# Patient Record
Sex: FEMALE | Race: OTHER | NOT HISPANIC OR LATINO | URBAN - METROPOLITAN AREA
[De-identification: names, ages, dates, MRNs, and addresses within clinical notes are randomized per-mention and may not be internally consistent; named-entity substitution may affect disease eponyms.]

---

## 2020-03-12 ENCOUNTER — EMERGENCY (EMERGENCY)
Facility: HOSPITAL | Age: 35
LOS: 1 days | Discharge: HOME | End: 2020-03-12
Attending: EMERGENCY MEDICINE
Payer: SELF-PAY

## 2020-03-12 VITALS
RESPIRATION RATE: 20 BRPM | OXYGEN SATURATION: 100 % | DIASTOLIC BLOOD PRESSURE: 86 MMHG | SYSTOLIC BLOOD PRESSURE: 136 MMHG | TEMPERATURE: 98 F | HEART RATE: 94 BPM

## 2020-03-12 LAB
ALBUMIN SERPL ELPH-MCNC: 4 G/DL — SIGNIFICANT CHANGE UP (ref 3.3–5.2)
ALP SERPL-CCNC: 74 U/L — SIGNIFICANT CHANGE UP (ref 40–120)
ALT FLD-CCNC: 14 U/L — SIGNIFICANT CHANGE UP
ANION GAP SERPL CALC-SCNC: 14 MMOL/L — SIGNIFICANT CHANGE UP (ref 5–17)
APPEARANCE UR: ABNORMAL
AST SERPL-CCNC: 20 U/L — SIGNIFICANT CHANGE UP
BACTERIA # UR AUTO: ABNORMAL
BILIRUB SERPL-MCNC: <0.2 MG/DL — LOW (ref 0.4–2)
BILIRUB UR-MCNC: NEGATIVE — SIGNIFICANT CHANGE UP
BLD GP AB SCN SERPL QL: SIGNIFICANT CHANGE UP
BUN SERPL-MCNC: 14 MG/DL — SIGNIFICANT CHANGE UP (ref 8–20)
CALCIUM SERPL-MCNC: 9.5 MG/DL — SIGNIFICANT CHANGE UP (ref 8.6–10.2)
CHLORIDE SERPL-SCNC: 102 MMOL/L — SIGNIFICANT CHANGE UP (ref 98–107)
CO2 SERPL-SCNC: 22 MMOL/L — SIGNIFICANT CHANGE UP (ref 22–29)
COLOR SPEC: YELLOW — SIGNIFICANT CHANGE UP
CREAT SERPL-MCNC: 0.69 MG/DL — SIGNIFICANT CHANGE UP (ref 0.5–1.3)
DIFF PNL FLD: ABNORMAL
EPI CELLS # UR: SIGNIFICANT CHANGE UP
GLUCOSE SERPL-MCNC: 95 MG/DL — SIGNIFICANT CHANGE UP (ref 70–99)
GLUCOSE UR QL: NEGATIVE MG/DL — SIGNIFICANT CHANGE UP
HCG SERPL-ACNC: 4367 MIU/ML — HIGH
HCG UR QL: POSITIVE
HCT VFR BLD CALC: 34.7 % — SIGNIFICANT CHANGE UP (ref 34.5–45)
HGB BLD-MCNC: 10.9 G/DL — LOW (ref 11.5–15.5)
KETONES UR-MCNC: ABNORMAL
LEUKOCYTE ESTERASE UR-ACNC: ABNORMAL
MCHC RBC-ENTMCNC: 26.5 PG — LOW (ref 27–34)
MCHC RBC-ENTMCNC: 31.4 GM/DL — LOW (ref 32–36)
MCV RBC AUTO: 84.2 FL — SIGNIFICANT CHANGE UP (ref 80–100)
NITRITE UR-MCNC: NEGATIVE — SIGNIFICANT CHANGE UP
PH UR: 6 — SIGNIFICANT CHANGE UP (ref 5–8)
PLATELET # BLD AUTO: 317 K/UL — SIGNIFICANT CHANGE UP (ref 150–400)
POTASSIUM SERPL-MCNC: 3.9 MMOL/L — SIGNIFICANT CHANGE UP (ref 3.5–5.3)
POTASSIUM SERPL-SCNC: 3.9 MMOL/L — SIGNIFICANT CHANGE UP (ref 3.5–5.3)
PROT SERPL-MCNC: 7.7 G/DL — SIGNIFICANT CHANGE UP (ref 6.6–8.7)
PROT UR-MCNC: 30 MG/DL
RBC # BLD: 4.12 M/UL — SIGNIFICANT CHANGE UP (ref 3.8–5.2)
RBC # FLD: 17 % — HIGH (ref 10.3–14.5)
RBC CASTS # UR COMP ASSIST: >50 /HPF (ref 0–4)
SODIUM SERPL-SCNC: 138 MMOL/L — SIGNIFICANT CHANGE UP (ref 135–145)
SP GR SPEC: 1.02 — SIGNIFICANT CHANGE UP (ref 1.01–1.02)
UROBILINOGEN FLD QL: NEGATIVE MG/DL — SIGNIFICANT CHANGE UP
WBC # BLD: 9.84 K/UL — SIGNIFICANT CHANGE UP (ref 3.8–10.5)
WBC # FLD AUTO: 9.84 K/UL — SIGNIFICANT CHANGE UP (ref 3.8–10.5)
WBC UR QL: SIGNIFICANT CHANGE UP

## 2020-03-12 PROCEDURE — 76801 OB US < 14 WKS SINGLE FETUS: CPT | Mod: 26

## 2020-03-12 PROCEDURE — 99285 EMERGENCY DEPT VISIT HI MDM: CPT

## 2020-03-12 PROCEDURE — 76817 TRANSVAGINAL US OBSTETRIC: CPT | Mod: 26

## 2020-03-12 PROCEDURE — 76815 OB US LIMITED FETUS(S): CPT | Mod: 26

## 2020-03-12 RX ORDER — ACETAMINOPHEN 500 MG
975 TABLET ORAL ONCE
Refills: 0 | Status: COMPLETED | OUTPATIENT
Start: 2020-03-12 | End: 2020-03-12

## 2020-03-12 RX ADMIN — Medication 975 MILLIGRAM(S): at 20:43

## 2020-03-12 NOTE — ED ADULT NURSE NOTE - OBJECTIVE STATEMENT
pt reports lots of abd pain today, LMP Michael 10, believed to had miscarried on Saturday. bleeding still reported. never had any prenatal care for this pregnancy, states she took a pregnancy test a couple times and it was positive.

## 2020-03-12 NOTE — ED STATDOCS - NS ED ROS FT
Const: Denies fever, chills  HEENT: Denies blurry vision, sore throat  Neck: Denies neck pain/stiffness  Resp: Denies coughing, SOB  Cardiovascular: Denies CP, palpitations, LE edema  GI: +abdominal pain, denies nausea, vomiting, diarrhea, constipation, blood in stool  : + vaginal bleeding denies urinary frequency/urgency/dysuria, hematuria,   MSK: Denies back pain  Neuro: + HA Denies, dizziness, numbness, weakness  Skin: Denies rashes.

## 2020-03-12 NOTE — ED STATDOCS - PROGRESS NOTE DETAILS
PA NOTE: PT evaluated by intake physician. HPI/PE/ROS as noted above. Will follow up plan per intake physician PA NOTE: Spoke to radiologist who advised no doppler was performed and a doppler would be needed to further evaluate, order was placed and pt brought back to ultrasound. PA NOTE: Spoke to radiologist who advised pt did not receive doppler, order was replaced and brought back down to ultrasound PA NOTE: repeat ultrasound shows On Doppler imaging there is minimally increased vascularity in the endometrium, advised repeat ultraosund, f/u with obgyn, pt wants to leave at this time, Pt reassessed, pt feeling better at this time, vss, pt able to walk, talk and vocalized plan of action. Discussed in depth and explained to pt in depth the next steps that need to be taking including proper follow up with PCP or specialists. All incidental findings were discussed with pt as well. Pt verbalized their concerns and all questions were answered. Pt understands dispo and wants discharge. Given good instructions when to return to ED and importance of f/u. PA NOTE: repeat ultrasound shows On Doppler imaging there is minimally increased vascularity in the endometrium, advised repeat ultrasound, f/u with obgyn, pt wants to leave at this time, does not want to wait to see OBGYN, Pt reassessed, pt feeling better at this time, vss, pt able to walk, talk and vocalized plan of action. Discussed in depth and explained to pt in depth the next steps that need to be taking including proper follow up with PCP or specialists. All incidental findings were discussed with pt as well. Pt verbalized their concerns and all questions were answered. Pt understands dispo and wants discharge. Given good instructions when to return to ED and importance of f/u.

## 2020-03-12 NOTE — ED STATDOCS - OBJECTIVE STATEMENT
34 A0 7 weeks pregnant by date 6 days s/p miscarriage presents to ED c/o heavy vaginal bleeding and worsening non radiating suprapubic cramping pain x 6 days. Bleeding is described as heavy vaginal bleeding with + clot passage. Has changed her pads 3 x today. LMP 1/10/20.  Patient has mild diffuse HA at this time. Denies fever, chills, dysuria. Denies CP, SOB, dizziness, lightheadedness, vomiting, diarrhea. Took Tylenol this AM with no relief in pain. Does not have a GYN or PMD. Is a non smoker. No further complaints at this time.

## 2020-03-12 NOTE — ED STATDOCS - PHYSICAL EXAMINATION
Const: Awake, alert and oriented. In no acute distress. Well appearing.  HEENT: NC/AT. Moist mucous membranes.  Eyes: No scleral icterus. EOMI.  Neck:. Soft and supple. Full ROM without pain.  Cardiac: Regular rate and rhythm. +S1/S2. No murmurs. Peripheral pulses 2+ and symmetric. No LE edema.  Resp: Speaking in full sentences. No evidence of respiratory distress. No wheezes, rales or rhonchi.  Abd: Soft, non-tender, non-distended. Normal bowel sounds in all 4 quadrants. No guarding or rebound.  Back: Spine midline and non-tender. No CVAT.  Skin: No rashes, abrasions or lacerations.  Neuro: Awake, alert & oriented x 3. Moves all extremities symmetrically.

## 2020-03-12 NOTE — ED STATDOCS - PATIENT PORTAL LINK FT
You can access the FollowMyHealth Patient Portal offered by [facility] by registering at the following website: [email or web address]. By joining [employer]’s FollowMyHealth portal, you will also be able to view your health information using other applications (apps) compatible with our system.

## 2020-03-12 NOTE — ED ADULT TRIAGE NOTE - CHIEF COMPLAINT QUOTE
Pt reports having miscarriage last Saturday and reports unresolved bleeding since then. C/o supra-pubic pain that started this morning. Denies fever.

## 2020-03-12 NOTE — ED STATDOCS - ATTENDING CONTRIBUTION TO CARE
I, Sarai Gracia, performed the initial face to face bedside interview with this patient regarding history of present illness, review of symptoms and relevant past medical, social and family history.  I completed an independent physical examination.  I was the initial provider who evaluated this patient. I have signed out the follow up of any pending tests (i.e. labs, radiological studies) to the ACP.  I have communicated the patient’s plan of care and disposition with the ACP.

## 2020-03-12 NOTE — ED STATDOCS - CLINICAL SUMMARY MEDICAL DECISION MAKING FREE TEXT BOX
34  at 7 weeks by date s/p miscarriage at home presents for worsening lower ABD and heavy vaginal bleeding. Hemodynamically stable at this time. Will check labs, sono to evaluate for retained products, Tylenol for pain and GYN consult as necessary.

## 2020-03-13 VITALS
DIASTOLIC BLOOD PRESSURE: 82 MMHG | HEART RATE: 84 BPM | OXYGEN SATURATION: 100 % | RESPIRATION RATE: 18 BRPM | SYSTOLIC BLOOD PRESSURE: 134 MMHG

## 2020-03-13 PROCEDURE — 86900 BLOOD TYPING SEROLOGIC ABO: CPT

## 2020-03-13 PROCEDURE — 36415 COLL VENOUS BLD VENIPUNCTURE: CPT

## 2020-03-13 PROCEDURE — 76801 OB US < 14 WKS SINGLE FETUS: CPT

## 2020-03-13 PROCEDURE — 85027 COMPLETE CBC AUTOMATED: CPT

## 2020-03-13 PROCEDURE — 86850 RBC ANTIBODY SCREEN: CPT

## 2020-03-13 PROCEDURE — 99284 EMERGENCY DEPT VISIT MOD MDM: CPT | Mod: 25

## 2020-03-13 PROCEDURE — 80053 COMPREHEN METABOLIC PANEL: CPT

## 2020-03-13 PROCEDURE — 81001 URINALYSIS AUTO W/SCOPE: CPT

## 2020-03-13 PROCEDURE — 76815 OB US LIMITED FETUS(S): CPT

## 2020-03-13 PROCEDURE — 76817 TRANSVAGINAL US OBSTETRIC: CPT | Mod: 26

## 2020-03-13 PROCEDURE — 76817 TRANSVAGINAL US OBSTETRIC: CPT

## 2020-03-13 PROCEDURE — 86901 BLOOD TYPING SEROLOGIC RH(D): CPT

## 2020-03-13 PROCEDURE — 81025 URINE PREGNANCY TEST: CPT

## 2020-03-13 PROCEDURE — 84702 CHORIONIC GONADOTROPIN TEST: CPT

## 2021-03-27 PROBLEM — Z78.9 OTHER SPECIFIED HEALTH STATUS: Chronic | Status: ACTIVE | Noted: 2020-03-12

## 2021-03-31 ENCOUNTER — APPOINTMENT (OUTPATIENT)
Dept: DISASTER EMERGENCY | Facility: OTHER | Age: 36
End: 2021-03-31

## 2021-04-28 ENCOUNTER — APPOINTMENT (OUTPATIENT)
Dept: DISASTER EMERGENCY | Facility: OTHER | Age: 36
End: 2021-04-28

## 2021-04-28 PROBLEM — Z00.00 ENCOUNTER FOR PREVENTIVE HEALTH EXAMINATION: Status: ACTIVE | Noted: 2021-04-28

## 2021-08-25 ENCOUNTER — ASOB RESULT (OUTPATIENT)
Age: 36
End: 2021-08-25

## 2021-08-25 ENCOUNTER — APPOINTMENT (OUTPATIENT)
Dept: ANTEPARTUM | Facility: CLINIC | Age: 36
End: 2021-08-25
Payer: MEDICAID

## 2021-08-25 PROCEDURE — 76811 OB US DETAILED SNGL FETUS: CPT
